# Patient Record
Sex: FEMALE | Race: AMERICAN INDIAN OR ALASKA NATIVE
[De-identification: names, ages, dates, MRNs, and addresses within clinical notes are randomized per-mention and may not be internally consistent; named-entity substitution may affect disease eponyms.]

---

## 2022-05-24 ENCOUNTER — HOSPITAL ENCOUNTER (OUTPATIENT)
Dept: HOSPITAL 5 - TRG | Age: 27
Discharge: HOME | End: 2022-05-24
Attending: OBSTETRICS & GYNECOLOGY
Payer: COMMERCIAL

## 2022-05-24 VITALS — DIASTOLIC BLOOD PRESSURE: 78 MMHG | SYSTOLIC BLOOD PRESSURE: 123 MMHG

## 2022-05-24 DIAGNOSIS — O24.913: ICD-10-CM

## 2022-05-24 DIAGNOSIS — Z3A.33: ICD-10-CM

## 2022-05-24 DIAGNOSIS — O13.3: Primary | ICD-10-CM

## 2022-05-24 DIAGNOSIS — J45.909: ICD-10-CM

## 2022-05-24 DIAGNOSIS — O99.513: ICD-10-CM

## 2022-05-24 LAB
ALT SERPL-CCNC: 8 UNITS/L (ref 7–56)
BACTERIA #/AREA URNS HPF: (no result) /HPF
BILIRUB UR QL STRIP: (no result)
BLOOD UR QL VISUAL: (no result)
HCT VFR BLD CALC: 35.5 % (ref 30.3–42.9)
HGB BLD-MCNC: 12.3 GM/DL (ref 10.1–14.3)
MCHC RBC AUTO-ENTMCNC: 35 % (ref 30–34)
MCV RBC AUTO: 95 FL (ref 79–97)
MUCOUS THREADS #/AREA URNS HPF: (no result) /HPF
PH UR STRIP: 7 [PH] (ref 5–7)
PLATELET # BLD: 313 K/MM3 (ref 140–440)
PROT UR STRIP-MCNC: (no result) MG/DL
RBC # BLD AUTO: 3.76 M/MM3 (ref 3.65–5.03)
RBC #/AREA URNS HPF: 8 /HPF (ref 0–6)
URATE SERPL-MCNC: 3.3 MG/DL (ref 3.5–7.6)
UROBILINOGEN UR-MCNC: < 2 MG/DL (ref ?–2)
WBC #/AREA URNS HPF: 11 /HPF (ref 0–6)

## 2022-05-24 PROCEDURE — 85027 COMPLETE CBC AUTOMATED: CPT

## 2022-05-24 PROCEDURE — 96361 HYDRATE IV INFUSION ADD-ON: CPT

## 2022-05-24 PROCEDURE — 36415 COLL VENOUS BLD VENIPUNCTURE: CPT

## 2022-05-24 PROCEDURE — 83615 LACTATE (LD) (LDH) ENZYME: CPT

## 2022-05-24 PROCEDURE — 81001 URINALYSIS AUTO W/SCOPE: CPT

## 2022-05-24 PROCEDURE — 84550 ASSAY OF BLOOD/URIC ACID: CPT

## 2022-05-24 PROCEDURE — 82565 ASSAY OF CREATININE: CPT

## 2022-05-24 PROCEDURE — 96374 THER/PROPH/DIAG INJ IV PUSH: CPT

## 2022-05-24 PROCEDURE — 84450 TRANSFERASE (AST) (SGOT): CPT

## 2022-05-24 PROCEDURE — 87086 URINE CULTURE/COLONY COUNT: CPT

## 2022-05-24 PROCEDURE — 84460 ALANINE AMINO (ALT) (SGPT): CPT

## 2022-06-30 ENCOUNTER — HOSPITAL ENCOUNTER (EMERGENCY)
Dept: HOSPITAL 5 - ED | Age: 27
LOS: 1 days | Discharge: HOME | End: 2022-07-01
Payer: COMMERCIAL

## 2022-06-30 DIAGNOSIS — Z91.09: ICD-10-CM

## 2022-06-30 DIAGNOSIS — I10: ICD-10-CM

## 2022-06-30 DIAGNOSIS — J45.909: ICD-10-CM

## 2022-06-30 DIAGNOSIS — Z79.899: ICD-10-CM

## 2022-06-30 DIAGNOSIS — N39.0: Primary | ICD-10-CM

## 2022-06-30 DIAGNOSIS — E11.9: ICD-10-CM

## 2022-06-30 PROCEDURE — 82550 ASSAY OF CK (CPK): CPT

## 2022-06-30 PROCEDURE — 96374 THER/PROPH/DIAG INJ IV PUSH: CPT

## 2022-06-30 PROCEDURE — 85025 COMPLETE CBC W/AUTO DIFF WBC: CPT

## 2022-06-30 PROCEDURE — 87086 URINE CULTURE/COLONY COUNT: CPT

## 2022-06-30 PROCEDURE — 93005 ELECTROCARDIOGRAM TRACING: CPT

## 2022-06-30 PROCEDURE — 80053 COMPREHEN METABOLIC PANEL: CPT

## 2022-06-30 PROCEDURE — 81001 URINALYSIS AUTO W/SCOPE: CPT

## 2022-06-30 PROCEDURE — 99284 EMERGENCY DEPT VISIT MOD MDM: CPT

## 2022-06-30 PROCEDURE — 71046 X-RAY EXAM CHEST 2 VIEWS: CPT

## 2022-06-30 PROCEDURE — 36415 COLL VENOUS BLD VENIPUNCTURE: CPT

## 2022-07-01 VITALS — DIASTOLIC BLOOD PRESSURE: 72 MMHG | SYSTOLIC BLOOD PRESSURE: 131 MMHG

## 2022-07-01 LAB
ALBUMIN SERPL-MCNC: 3.6 G/DL (ref 3.9–5)
ALT SERPL-CCNC: 16 UNITS/L (ref 7–56)
BASOPHILS # (AUTO): 0.1 K/MM3 (ref 0–0.1)
BASOPHILS NFR BLD AUTO: 1.1 % (ref 0–1.8)
BILIRUB UR QL STRIP: (no result)
BLOOD UR QL VISUAL: (no result)
BUN SERPL-MCNC: 7 MG/DL (ref 7–17)
BUN/CREAT SERPL: 12 %
CALCIUM SERPL-MCNC: 9.7 MG/DL (ref 8.4–10.2)
EOSINOPHIL # BLD AUTO: 0 K/MM3 (ref 0–0.4)
EOSINOPHIL NFR BLD AUTO: 0.1 % (ref 0–4.3)
HCT VFR BLD CALC: 36.2 % (ref 30.3–42.9)
HEMOLYSIS INDEX: 179
HGB BLD-MCNC: 12.1 GM/DL (ref 10.1–14.3)
LYMPHOCYTES # BLD AUTO: 2 K/MM3 (ref 1.2–5.4)
LYMPHOCYTES NFR BLD AUTO: 25.3 % (ref 13.4–35)
MCHC RBC AUTO-ENTMCNC: 34 % (ref 30–34)
MCV RBC AUTO: 95 FL (ref 79–97)
MONOCYTES # (AUTO): 0.5 K/MM3 (ref 0–0.8)
MONOCYTES % (AUTO): 6.6 % (ref 0–7.3)
MUCOUS THREADS #/AREA URNS HPF: (no result) /HPF
PH UR STRIP: 7 [PH] (ref 5–7)
PLATELET # BLD: 401 K/MM3 (ref 140–440)
PROT UR STRIP-MCNC: (no result) MG/DL
RBC # BLD AUTO: 3.8 M/MM3 (ref 3.65–5.03)
RBC #/AREA URNS HPF: 59 /HPF (ref 0–6)
UROBILINOGEN UR-MCNC: < 2 MG/DL (ref ?–2)
WBC #/AREA URNS HPF: 15 /HPF (ref 0–6)

## 2022-07-01 NOTE — XRAY REPORT
CHEST 2 VIEWS 



INDICATION / CLINICAL INFORMATION: HIGH B P .



COMPARISON: None available.



FINDINGS:



SUPPORT DEVICES: None.

HEART / MEDIASTINUM: No significant abnormality. 

LUNGS / PLEURA: No significant pulmonary or pleural abnormality. No pneumothorax. 



ADDITIONAL FINDINGS: No significant additional findings.



IMPRESSION:

1. No acute findings.



Signer Name: Renzo Valdivia DO 

Signed: 7/1/2022 3:42 AM

Workstation Name: [id number]

## 2022-07-01 NOTE — EMERGENCY DEPARTMENT REPORT
ED General Adult HPI





- General


Chief complaint: High BP


Stated complaint: POST HYPERTENSION


Time Seen by Provider: 22 03:19


Source: patient


Mode of arrival: Ambulatory


Limitations: No Limitations





- History of Present Illness


Initial comments: 


Patient is a 26-year-old -American female history of hypertension.  

Patient is  A0.  Patient is postpartum as of 2022.  Patient presents 

for headache and elevated BP.  There is no chest pain no nausea no vomiting no 

shortness of breath.  No cramping.  No back pain.  Patient states lochia is 

normal using 1-2 pads daily.  Patient denies history of anemia.  Patient is a 

non-smoker no substance.  Patient is breast-feeding making ample milk supply.  

Denies other stressors.  Is been no fevers no chills.  OB/GYN is Dr. Elvia Tompkins.





Severity scale (0 -10): 7





- Related Data


                                  Previous Rx's











 Medication  Instructions  Recorded  Last Taken  Type


 


HYDROcodone/APAP 5-325 [[place] 1 each PO Q6HR PRN #15 tablet 22 Unknown Rx





5/325]    


 


Ibuprofen [Motrin] 800 mg PO Q8HR PRN #30 tablet 22 Unknown Rx


 


Acetaminophen [Acetaminophen TAB] 650 mg PO Q6HR PRN #30 tablet 22 Unknown

Rx


 


cephALEXin [Keflex] 500 mg PO BID 7 Days #14 cap 22 Unknown Rx


 


labetaloL [Labetalol 200mg TAB] 200 mg PO BID #60 tab 22 Unknown Rx











                                    Allergies











Allergy/AdvReac Type Severity Reaction Status Date / Time


 


sulfamethoxazole Allergy Severe Hives Verified 22 20:16





[From Bactrim]     


 


trimethoprim [From Bactrim] Allergy Severe Hives Verified 22 20:16














ED Review of Systems


ROS: 


Stated complaint: POST HYPERTENSION


Other details as noted in HPI





Constitutional: denies: chills, fever


Eyes: denies: eye pain, eye discharge, vision change


ENT: denies: ear pain, throat pain


Respiratory: denies: cough, shortness of breath, wheezing


Cardiovascular: denies: chest pain, palpitations


Endocrine: no symptoms reported


Gastrointestinal: denies: abdominal pain, nausea, vomiting, diarrhea


Genitourinary: denies: urgency, dysuria, discharge


Musculoskeletal: denies: back pain, joint swelling, arthralgia


Skin: denies: rash, lesions


Neurological: headache.  denies: weakness, paresthesias


Psychiatric: denies: anxiety, depression


Hematological/Lymphatic: denies: easy bleeding, easy bruising





ED Past Medical Hx





- Past Medical History


Hx Hypertension: Yes


Hx Heart Attack/AMI: No


Hx Congestive Heart Failure: No


Hx Diabetes: Yes


Hx Deep Vein Thrombosis: No


Hx Liver Disease: No


Hx Renal Disease: No


Hx Sickle Cell Disease: No


Hx Seizures: No


Hx Asthma: Yes (as a child)


Hx COPD: No


Hx HIV: No





- Surgical History


Hx Pacemaker: No


Hx Internal Defibrillator: No





- Social History


Smoking Status: Never Smoker





- Medications


Home Medications: 


                                Home Medications











 Medication  Instructions  Recorded  Confirmed  Last Taken  Type


 


HYDROcodone/APAP 5-325 [[place] 1 each PO Q6HR PRN #15 tablet 22  Unknown Rx





5/325]     


 


Ibuprofen [Motrin] 800 mg PO Q8HR PRN #30 tablet 22  Unknown Rx


 


Acetaminophen [Acetaminophen TAB] 650 mg PO Q6HR PRN #30 tablet 22  

Unknown Rx


 


cephALEXin [Keflex] 500 mg PO BID 7 Days #14 cap 22  Unknown Rx


 


labetaloL [Labetalol 200mg TAB] 200 mg PO BID #60 tab 22  Unknown Rx














ED Physical Exam





- General


Limitations: No Limitations


General appearance: alert, in no apparent distress





- Head


Head exam: Present: atraumatic, normocephalic





- Eye


Eye exam: Present: normal appearance, PERRL, EOMI.  Absent: conjunctival i

njection, nystagmus


Pupils: Present: normal accommodation





- ENT


ENT exam: Present: mucous membranes moist





- Neck


Neck exam: Present: normal inspection, full ROM.  Absent: tenderness, 

lymphadenopathy





- Respiratory


Respiratory exam: Present: normal lung sounds bilaterally.  Absent: respiratory 

distress, wheezes, stridor, chest wall tenderness





- Cardiovascular


Cardiovascular Exam: Present: regular rate, normal rhythm, normal heart sounds. 

Absent: systolic murmur, diastolic murmur, rubs, gallop





- GI/Abdominal


GI/Abdominal exam: Present: soft, normal bowel sounds.  Absent: distended, 

tenderness, guarding, rebound, rigid, bruit, hernia





- Rectal


Rectal exam: Present: deferred





- 


External exam: Present: other (Deferred)





- Extremities Exam


Extremities exam: Present: normal inspection, full ROM, normal capillary refill.

 Absent: tenderness, pedal edema, joint swelling, calf tenderness





- Back Exam


Back exam: Present: normal inspection, full ROM.  Absent: tenderness, CVA 

tenderness (R), CVA tenderness (L)





- Neurological Exam


Neurological exam: Present: alert, oriented X3, CN II-XII intact, normal gait





- Psychiatric


Psychiatric exam: Present: normal affect, normal mood.  Absent: anxious





- Skin


Skin exam: Present: warm, dry, intact, normal color.  Absent: rash





ED Course


                                   Vital Signs











  22





  22:54 03:50 04:00


 


Temperature 98.5 F  


 


Pulse Rate 91 H 81 


 


Respiratory 18  





Rate   


 


Blood Pressure 205/121 178/97 178/97


 


O2 Sat by Pulse 99 95 96





Oximetry   














ED Medical Decision Making





- Lab Data


Result diagrams: 


                                 22 03:55





                                 22 03:55








Labs











  22





  03:55 03:55 Unknown


 


WBC  7.7  


 


RBC  3.80  


 


Hgb  12.1  


 


Hct  36.2  


 


MCV  95  


 


MCH  32  


 


MCHC  34  


 


RDW  13.5  


 


Plt Count  401  


 


Lymph % (Auto)  25.3  


 


Mono % (Auto)  6.6  


 


Eos % (Auto)  0.1  


 


Baso % (Auto)  1.1  


 


Lymph # (Auto)  2.0  


 


Mono # (Auto)  0.5  


 


Eos # (Auto)  0.0  


 


Baso # (Auto)  0.1  


 


Seg Neutrophils %  66.9  


 


Seg Neutrophils #  5.2  


 


Sodium   138 


 


Potassium   5.0 


 


Chloride   101.9 


 


Carbon Dioxide   25 


 


Anion Gap   16 


 


BUN   7 


 


Creatinine   0.6 


 


Estimated GFR   > 60 


 


BUN/Creatinine Ratio   12 


 


Glucose   93 


 


Calcium   9.7 


 


Total Bilirubin   0.40 


 


AST   30 


 


ALT   16 


 


Alkaline Phosphatase   Not Reportable 


 


Total Creatine Kinase   125 


 


Total Protein   7.0 


 


Albumin   3.6 L 


 


Albumin/Globulin Ratio   1.1 


 


Urine Color    Yellow


 


Urine Turbidity    Clear


 


Urine pH    7.0


 


Ur Specific Gravity    1.010


 


Urine Protein    <15 mg/dl


 


Urine Glucose (UA)    Neg


 


Urine Ketones    Neg


 


Urine Blood    Lg


 


Urine Nitrite    Neg


 


Urine Bilirubin    Neg


 


Urine Urobilinogen    < 2.0


 


Ur Leukocyte Esterase    Lg


 


Urine WBC (Auto)    15.0 H


 


Urine RBC (Auto)    59.0


 


U Epithel Cells (Auto)    2.0


 


Urine Mucus    Few


 


Urine Yeast (Budding)    Few














- EKG Data


EKG shows normal: sinus rhythm, axis, intervals, QRS complexes, ST-T waves


Rate: normal





- EKG Data


Interpretation: normal EKG (sinus arhythm no ST elevated MI interpreted by ED 

attending.)





- Radiology Data


Radiology results: report reviewed, image reviewed


 


CHEST 2 VIEWS   


 


 INDICATION / CLINICAL INFORMATION: HIGH B P .  


 


 COMPARISON: None available.  


 


 FINDINGS:  


 


 SUPPORT DEVICES: None.  


 HEART / MEDIASTINUM: No significant abnormality.   


 LUNGS / PLEURA: No significant pulmonary or pleural abnormality. No 

pneumothorax.   


 


 ADDITIONAL FINDINGS: No significant additional findings.  


 


 IMPRESSION:  


 1. No acute findings.  


 


 Signer Name: Renzo Valdivia DO   


 Signed: 2022 3:42 AM  


 Workstation Name: [id number]   


 


 


Transcribed By: NS  


Dictated By: RENZO VALDIVIA DO  


Electronically Authenticated By: RENZO VALDIVIA DO    


Signed Date/Time: 22                                


 


 


 


DD/DT: 22                                                            

 


TD/TT:


Print








- Medical Decision Making


EKG is sinus arrhythmia heart rate 55 normal intervals otherwise normal EKG, 

chest x-ray normal no infiltrates no opacities.  Lung sounds are clear 

throughout respirations are even and nonlabored.  There is no cramping.  There 

is no tachycardia no fever no chills.  Abdomen is nontender.  Labs noted above. 

CPK is normal, UA noted for mild leukocytes and bacteria will treat for UTI.  

This is not preeclampsia.  In face of intermittent nonadherence to metoprolol, 

blood pressure improved to 160/80 plan labetalol 200 mg p.o. twice daily, call O

B/GYN this a.m.  Patient verbalized agreement understanding with same.  Patient 

is currently alert oriented x3 amatory with steady gait tolerating p.o. intake 

patient advises headache is relieved.  Will talk to Dr. Tompkins this a.m.  Patient

DC'd home in stable condition at this time.





Critical care attestation.: 


If time is entered above; I have spent that time in minutes in the direct care 

of this critically ill patient, excluding procedure time.








ED Disposition


Clinical Impression: 


 Chronic hypertension





UTI (urinary tract infection)


Qualifiers:


 Urinary tract infection type: acute cystitis Hematuria presence: without 

hematuria Qualified Code(s): N30.00 - Acute cystitis without hematuria





Disposition:  HOME / SELF CARE / HOMELESS


Is pt being admited?: No


Does the pt Need Aspirin: No


Condition: Stable


Instructions:  Hypertension (ED), Urinary Tract Infection, Adult, Easy-to-Read, 

Hypertension During Pregnancy


Additional Instructions: 


Take medications as prescribed, follow-up with Dr. Tompkins today.  Return to 

emergency department should symptoms worsen.


Prescriptions: 


Acetaminophen [Acetaminophen TAB] 650 mg PO Q6HR PRN #30 tablet


 PRN Reason: Pain


cephALEXin [Keflex] 500 mg PO BID 7 Days #14 cap


labetaloL [Labetalol 200mg TAB] 200 mg PO BID #60 tab


Referrals: 


ELVIA TOMPKINS MD [Staff Physician] - 3-5 Days


Forms:  Work/School Release Form(ED)


Time of Disposition: 05:40

## 2022-07-01 NOTE — ELECTROCARDIOGRAPH REPORT
[facility]

                                       

Test Date:    2022               Test Time:    04:01:20

Pat Name:     ANT MERCADO        Department:   

Patient ID:   [id number]          Room:          

Gender:       F                        Technician:   JUVE

:          [id number]               Requested By: AILYN TOTH

Order Number: [id number]              Reading MD:   Herberth Benavides

                                 Measurements

Intervals                              Axis          

Rate:         55                       P:            54

NV:           151                      QRS:          67

QRSD:         86                       T:            47

QT:           398                                    

QTc:          381                                    

                           Interpretive Statements

Slow sinus arrhythmia

No previous ECG available for comparison

Electronically Signed On 2022 11:17:31 EDT by Herberth Benavides

## 2022-07-20 ENCOUNTER — HOSPITAL ENCOUNTER (INPATIENT)
Dept: HOSPITAL 5 - 3A | Age: 27
LOS: 2 days | Discharge: HOME | DRG: 776 | End: 2022-07-22
Attending: OBSTETRICS & GYNECOLOGY | Admitting: OBSTETRICS & GYNECOLOGY
Payer: COMMERCIAL

## 2022-07-20 DIAGNOSIS — Z20.822: ICD-10-CM

## 2022-07-20 DIAGNOSIS — Z83.3: ICD-10-CM

## 2022-07-20 DIAGNOSIS — Z82.49: ICD-10-CM

## 2022-07-20 LAB
ALT SERPL-CCNC: 14 UNITS/L (ref 7–56)
HCT VFR BLD CALC: 40.4 % (ref 30.3–42.9)
HGB BLD-MCNC: 13 GM/DL (ref 10.1–14.3)
MCHC RBC AUTO-ENTMCNC: 32 % (ref 30–34)
MCV RBC AUTO: 95 FL (ref 79–97)
PLATELET # BLD: 333 K/MM3 (ref 140–440)
RBC # BLD AUTO: 4.26 M/MM3 (ref 3.65–5.03)
URATE SERPL-MCNC: 5.7 MG/DL (ref 3.5–7.6)

## 2022-07-20 PROCEDURE — 86850 RBC ANTIBODY SCREEN: CPT

## 2022-07-20 PROCEDURE — U0003 INFECTIOUS AGENT DETECTION BY NUCLEIC ACID (DNA OR RNA); SEVERE ACUTE RESPIRATORY SYNDROME CORONAVIRUS 2 (SARS-COV-2) (CORONAVIRUS DISEASE [COVID-19]), AMPLIFIED PROBE TECHNIQUE, MAKING USE OF HIGH THROUGHPUT TECHNOLOGIES AS DESCRIBED BY CMS-2020-01-R: HCPCS

## 2022-07-20 PROCEDURE — 86901 BLOOD TYPING SEROLOGIC RH(D): CPT

## 2022-07-20 PROCEDURE — 84550 ASSAY OF BLOOD/URIC ACID: CPT

## 2022-07-20 PROCEDURE — 85027 COMPLETE CBC AUTOMATED: CPT

## 2022-07-20 PROCEDURE — 82565 ASSAY OF CREATININE: CPT

## 2022-07-20 PROCEDURE — 36415 COLL VENOUS BLD VENIPUNCTURE: CPT

## 2022-07-20 PROCEDURE — 86900 BLOOD TYPING SEROLOGIC ABO: CPT

## 2022-07-20 PROCEDURE — 84460 ALANINE AMINO (ALT) (SGPT): CPT

## 2022-07-20 PROCEDURE — 83615 LACTATE (LD) (LDH) ENZYME: CPT

## 2022-07-20 PROCEDURE — 84450 TRANSFERASE (AST) (SGOT): CPT

## 2022-07-20 PROCEDURE — 83735 ASSAY OF MAGNESIUM: CPT

## 2022-07-20 NOTE — HISTORY AND PHYSICAL REPORT
History of Present Illness


Date of examination: 22


Chief complaint: 





headche, scotomata, elevated blood pressure 


History of present illness: 





Pt is a 26 year old -American female  s/p  on 22 with a 

h/o chronic hypertension well-controlled on labetalol 200 mg presents to the 

office today with headache, scotomata and /100. She denies RUQ pain. She 

has a h/o preeclampsia in her last pregnancy.





Past History


Past Medical History: hypertension, other (Irregular hear beat )


Past Surgical History: no surgical history


Family/Genetic History: diabetes, heart disease, hypertension


Social history: no significant social history





- Obstetrical History


: 2


Para: 2


Hx # Term Pregnancies: 1


Number of  Pregnancies: 1


Spontaneous Abortions: 0


Induced : 0


Number of Living Children: 2





Medications and Allergies


                                    Allergies











Allergy/AdvReac Type Severity Reaction Status Date / Time


 


sulfamethoxazole Allergy Severe Hives Verified 22 20:16





[From Bactrim]     


 


trimethoprim [From Bactrim] Allergy Severe Hives Verified 22 20:16











                                Home Medications











 Medication  Instructions  Recorded  Confirmed  Last Taken  Type


 


HYDROcodone/APAP 5-325 [[place] 1 each PO Q6HR PRN #15 tablet 22  Unknown Rx





5/325]     


 


Ibuprofen [Motrin] 800 mg PO Q8HR PRN #30 tablet 22  Unknown Rx


 


Acetaminophen [Acetaminophen TAB] 650 mg PO Q6HR PRN #30 tablet 22  

Unknown Rx


 


cephALEXin [Keflex] 500 mg PO BID 7 Days #14 cap 22  Unknown Rx


 


labetaloL [Labetalol 200mg TAB] 200 mg PO BID #60 tab 22  Unknown Rx











Active Meds: 


Active Medications





Acetaminophen/Butalbital/Caffeine (Butalb/Acetaminophen/Caffeine Tab)  2 tab PO 

Q4H PRN


   PRN Reason: Headache


Calcium Gluconate (Calcium Gluconate 1000 Mg/10 Ml Inj)  1,000 mg IV ONCE PRN


   PRN Reason: Hypomagnesemia


Hydralazine HCl (Hydralazine 20 Mg/1 Ml Inj)  5 mg IV Q30MIN PRN


   PRN Reason: Hypertension


Lactated Ringer's (Lactated Ringers)  1,000 mls @ 125 mls/hr IV AS DIRECT BETTY


Magnesium Sulfate (Magnesium Sulfate 4gm/100ml)  4 gm in 100 mls @ 300 mls/hr IV

ONCE ONE


   Stop: 22 17:09


Magnesium Sulfate (Magnesium Sulfate 40gm/1000ml)  40 gm in 1,000 mls @ 50 

mls/hr IV AS DIRECT BETTY


Labetalol HCl (Labetalol 20 Mg/4 Ml Inj)  20 mg IV ONCE ONE


   Stop: 22 16:51


Labetalol HCl (Labetalol 200 Mg Tab)  200 mg PO BID BETTY











Review of Systems


All systems: negative (per HPI)





- Physical Exam


Breasts: Positive: deferred


Cardiovascular: Regular rate


Lungs: Positive: Clear to auscultation


Abdomen: Positive: soft (obese )


Extremities: Negative: tenderness, edema





Results


All other labs normal.








Assessment and Plan





A: Chronic HTN with superimposed preeclampsia


   


P: Admit for IV magnesium sulfate for seizure prophylaxis


    IV antihypertensives PRN, Home med of Labetalol 200 mg ID ordered


    PIH panel 


    Closely monitor clinical status

## 2022-07-21 NOTE — PROGRESS NOTE
Assessment and Plan





- Patient Problems


(1) Pre-eclampsia, postpartum


Current Visit: Yes   Status: Acute   


Plan to address problem: 


Obtain Magnesium level


 Continue to monitor B/Ps closely








Subjective





- Subjective


Date of service: 22


Principal diagnosis: Postpartum Pre-E


Interval history: 





Pt is a 26 year old -American female  s/p  on 22 with a 

h/o chronic hypertension well-controlled on labetalol 200 mg presents to the 

office today with headache, scotomata and /100. She denies RUQ pain. She 

has a h/o preeclampsia in her last pregnancy. Currently on Magnesium.


Patient reports: other (pt reports "I feel dizzy")





Objective





- Vital Signs


Latest vital signs: 


                                   Vital Signs











  Temp Pulse Resp BP Pulse Ox Pulse Ox


 


 22 09:19   66   131/79  


 


 22 09:17   66    100 


 


 22 09:12   57 L    99 


 


 22 09:07   74    100 


 


 22 09:04   64   127/73  


 


 22 09:02   71    100 


 


 22 08:57   58 L    99 


 


 22 08:52   58 L    99 


 


 22 08:49   67   129/71  


 


 22 08:47   57 L    99 


 


 22 08:42   64    99 


 


 22 08:37   64    100 


 


 22 08:35   69   132/71  


 


 22 08:32   64    100 


 


 22 08:27   73    100 


 


 22 08:22   93 H    100 


 


 22 08:20   82   161/94  


 


 22 08:17   70    100 


 


 22 08:16   76    94 


 


 22 08:12   71    100 


 


 22 08:07   68    99 


 


 22 08:05   71   149/86  


 


 22 08:02   80    100 


 


 22 07:57   67    99 


 


 22 07:52   64    100 


 


 22 07:49   76   138/72  


 


 22 07:47   71    100 


 


 22 07:42   67    99 


 


 22 07:37   66    100 


 


 22 07:34   59 L   131/62  


 


 22 07:32   58 L    99 


 


 22 07:27   56 L    99 


 


 22 07:22   61    99 


 


 22 07:20   72   136/62  


 


 22 07:17   64    99 


 


 22 07:12   63    99 


 


 22 07:07   65    98 


 


 22 07:05   64   118/67  


 


 22 07:02   64    98 


 


 22 06:57   70    98 


 


 22 06:52   64    98 


 


 22 06:49   65   143/73  


 


 22 06:47   65    98 


 


 22 06:42   61    99 


 


 22 06:37   64    99 


 


 22 06:34   69   132/83  


 


 22 06:32   66    98 


 


 22 06:27   74    99 


 


 22 06:22   62    99 


 


 22 06:20   80   139/83  


 


 22 06:17   80    99 


 


 22 06:12   73    98 


 


 22 06:07   58 L    99 


 


 22 06:04   56 L   117/69  


 


 22 06:02   58 L    99 


 


 22 05:57   60    98 


 


 22 05:52   58 L    98 


 


 22 05:49   56 L   125/71  


 


 22 05:47   56 L    99 


 


 22 05:42   62    98 


 


 22 05:37   59 L    98 


 


 22 05:34   63   117/68  


 


 22 05:32   62    98 


 


 22 05:27   61    98 


 


 22 05:22   60    99 


 


 22 05:19   60   123/65  


 


 22 05:17   57 L    99 


 


 22 05:12   57 L    99 


 


 22 05:07   54 L    99 


 


 22 05:05   64   135/68  


 


 22 05:02   54 L    98 


 


 22 04:57   54 L    98 


 


 22 04:52   61    99 


 


 22 04:49   68   148/88  


 


 22 04:47   61    99 


 


 22 04:42   63    99 


 


 22 04:37   56 L    99 


 


 22 04:34   58 L   140/79  


 


 22 04:32   58 L    99 


 


 22 04:27   63    97 


 


 22 04:25   91 H    92 


 


 22 04:22   75    99 


 


 22 04:19   67   142/77  


 


 22 04:17   68    99 


 


 22 04:12   82    100 


 


 22 04:07   65    99 


 


 22 04:04   71   144/75  


 


 22 04:02   66    99 


 


 22 03:57   67    100 


 


 22 03:52   67    100 


 


 22 03:49   78   146/76  


 


 22 03:47   76    99 


 


 22 03:42   110 H    97 


 


 22 03:37   82    100 


 


 22 03:35   80    94 


 


 22 03:34   77   144/73  


 


 22 03:32   61    99 


 


 22 03:27   61    99 


 


 22 03:22   56 L    99 


 


 22 03:19   73   140/68  


 


 22 03:17   71    99 


 


 22 03:12   60    99 


 


 22 03:07   60    99 


 


 22 03:04   66   129/62  


 


 22 03:02   65    98 


 


 22 02:57   66    97 


 


 22 02:52   66    98 


 


 22 02:49   73   129/62  


 


 22 02:47   64    98 


 


 22 02:42   63    98 


 


 22 02:37   59 L    98 


 


 22 02:34   64   144/66  


 


 22 02:32   57 L    99 


 


 22 02:27   61    98 


 


 22 02:22   57 L    98 


 


 22 02:20   62   127/61  


 


 22 02:17   59 L    98 


 


 22 02:12   61    99 


 


 22 02:07   79    98 


 


 22 02:05   74   147/70  


 


 22 02:02   70    98 


 


 22 01:57   72    99 


 


 22 01:52   73    99 


 


 22 01:49   76   156/87  


 


 22 01:47   79    99 


 


 22 01:42   76    99 


 


 22 01:37   67    100 


 


 22 01:35   77   157/91  


 


 22 01:32   84    100 


 


 22 01:27   75    99 


 


 22 01:22   94 H    100 


 


 22 01:20   88   174/104  


 


 22 01:17   100 H    100 


 


 22 01:15   88    94 


 


 22 01:12   74    100 


 


 22 01:07   80    97 


 


 22 01:06   87    91 


 


 22 01:05   93 H   188/84  


 


 22 01:02   76    100 


 


 22 00:57   74    100 


 


 22 00:55   91 H    91 


 


 22 00:52   77    100 


 


 22 00:50   76   181/99  


 


 22 00:47   71    99 


 


 22 00:42   71    100 


 


 22 00:37   77    100 


 


 22 00:34   77   128/67  


 


 22 00:32   76    100 


 


 22 00:27   72    100 


 


 22 00:22   79    100 


 


 22 00:19   71   140/67  


 


 22 00:17   79    98 


 


 22 00:12   73    99 


 


 22 00:07   75    99 


 


 22 00:04   77   131/72  


 


 22 00:02   75    98 


 


 22 23:57   64    99 


 


 22 23:52   66    98 


 


 22 23:49   68   126/70  


 


 22 23:47   69    99 


 


 22 23:42   64    100 


 


 22 23:37   69    100 


 


 22 23:35   75   130/80  


 


 22 23:32   65    100 


 


 22 23:27   72    100 


 


 22 23:22   76    100 


 


 22 23:19   68   152/86  


 


 22 23:17   77    100 


 


 22 23:12   74    100 


 


 22 23:07   75    98 


 


 22 23:04   86   145/79  


 


 07/20/22 23:02   82    98 


 


 0720/22 22:57   82    99 


 


 0720/22 22:52   81    99 


 


 0720/22 22:49   74   143/77  


 


 0720/22 22:47   79    98 


 


 072022 22:42   77    99 


 


 0720/22 22:37   71    99 


 


 072022 22:34   80   147/79  


 


 0720/22 22:32   76    99 


 


 072022 22:27   80    99 


 


 072022 22:22   75    99 


 


 072022 22:19   78   147/76  


 


 0720/22 22:17   103 H    100 


 


 0720/22 22:12   71    99 


 


 0720/22 22:07   69    99 


 


 072022 22:04   76   134/74  


 


 072022 22:02   85    99 


 


 072022 21:57   82    99 


 


 072022 21:52   85    100 


 


 22 21:49   82   142/76  


 


 2022 21:47   86    98 


 


 072022 21:42   92 H    98 


 


 2022 21:37   85    99 


 


 072022 21:35   85   141/73  


 


 072022 21:32   89    99 


 


 0720/22 21:27   89    99 


 


 0720/22 21:22   101 H    99 


 


 0720/22 21:19   94 H   167/83  


 


 072022 21:17   99 H    99 


 


 0720/22 21:12   97 H    99 


 


 2022 21:07   91 H    98 


 


 0722 21:04   92 H   164/94  


 


 0722 21:02   92 H    99 


 


 2022 20:57   88    98 


 


 0720/22 20:52   74    99 


 


 0720/22 20:49   85   155/87  


 


 0720/22 20:47   70    99 


 


 0720/22 20:42   70    100 


 


 0720/22 20:37   86    100 


 


 0720/22 20:34   80   160/88  


 


 0720/22 20:32   83    100 


 


 0720/22 20:27   79    98 


 


 0720/22 20:22   67    100 


 


 0720/22 20:19   71   150/86  


 


 0720/22 20:17   62    99 


 


 0720/22 20:12   70    100 


 


 0720/22 20:07   65    99 


 


 0720/22 20:04   68   140/83  


 


 22 20:02   60    99 


 


 22 19:57   63    99 


 


 22 19:52   68    99 


 


 22 19:47   68    100 


 


 22 19:45   55 L   156/82  


 


 22 19:42   63    99 


 


 22 19:40   59 L   163/80  


 


 22 19:37   70    100 


 


 22 19:35   65   166/85  


 


 22 19:32   66    100 


 


 22 19:30   71   159/82  


 


 22 19:27   65    100 


 


 22 19:26   65   154/77  


 


 22 19:22   76    100 


 


 22 19:17   73   179/79  100 


 


 22 19:12   63    100  98


 


 22 19:10   56 L   199/95  


 


 22 19:07   58 L    100 


 


 22 19:02   59 L    100 


 


 22 18:57   62    100 


 


 22 18:52   57 L    100 


 


 22 18:49   65    87 


 


 22 18:47   63   166/83  99 


 


 22 18:42   65    100 


 


 22 18:37   60    100 


 


 22 18:32   66    100 


 


 22 18:30  98.4 F   20   


 


 22 18:28   56 L   192/79  


 


 22 18:27   74    97 








                                Intake and Output











 22





 23:59 07:59 15:59


 


Intake Total 50  


 


Output Total 500 300 


 


Balance -450 -300 


 


Intake:   


 


  IV 50  


 


    Right Hand 50  


 


Output:   


 


  Urine 500 300 


 


    Void 500 300 


 


Other:   


 


  Total, Output Amount 300 100 


 


  Weight 97.976 kg 97.976 kg 








                                 Patient Weight











 22





 23:59


 


Weight 97.976 kg














- Exam


Breasts: Present: normal


Cardiovascular: Present: Regular rate


Lungs: Present: Normal air movement


Abdomen: Present: soft


Extremities: Present: edema


Deep Tendon Reflex Grade: Normal +2





- Labs


Labs: 


                              Abnormal lab results











  22 Range/Units





  18:56 


 


RDW  12.8 L  (13.2-15.2)  % Patient waiting for [employer].

## 2022-07-22 VITALS — SYSTOLIC BLOOD PRESSURE: 137 MMHG | DIASTOLIC BLOOD PRESSURE: 68 MMHG

## 2022-07-22 NOTE — PROGRESS NOTE
Assessment and Plan





A: Chronic HTN with superimposed preeclampsia s/p magnesium sulfate for seizure 

prophylaxis 


   


P: Increase labetalol 300 mg BID. Discharge today with follow up in 1 wk for BP 

check 





Subjective





- Subjective


Date of service: 07/22/22


Principal diagnosis: Postpartum Pre-E


Interval history: 





Pt feels well today. She denies headache, blurred vision and scotomata. 


Patient reports: appetite normal, voiding normally, pain well controlled, 

ambulating normally





Objective





- Vital Signs


Latest vital signs: 


                                   Vital Signs











  Temp Pulse Resp BP BP Pulse Ox Pulse Ox


 


 07/22/22 04:37  98.8 F  77  18   127/57  


 


 07/22/22 01:05   74   155/92   97 


 


 07/22/22 01:00  98.7 F  97 H  16   147/80  


 


 07/21/22 21:20   61   151/82   


 


 07/21/22 20:57  98.1 F  74  16   150/77  100  99


 


 07/21/22 20:22   76     96 


 


 07/21/22 20:20  97.7 F  68   149/91   


 


 07/21/22 20:17   69     100 


 


 07/21/22 20:15   58 L   144/83   


 


 07/21/22 20:14   78     88 


 


 07/21/22 20:12   71     99 


 


 07/21/22 20:07   66     100 


 


 07/21/22 20:04   61   153/86   


 


 07/21/22 20:02   63     98 


 


 07/21/22 19:57   63     100 


 


 07/21/22 19:52   62     99 


 


 07/21/22 19:49   59 L   150/84   


 


 07/21/22 19:47   61     99 


 


 07/21/22 19:42   63     99 


 


 07/21/22 19:37   58 L     100 


 


 07/21/22 19:34   67   140/76   


 


 07/21/22 19:32   59 L     99 


 


 07/21/22 19:27   59 L     100 


 


 07/21/22 19:22   63     98 


 


 07/21/22 19:20   61   139/77   


 


 07/21/22 19:17   71     100 


 


 07/21/22 19:12   76     93 


 


 07/21/22 19:08   70     89 


 


 07/21/22 19:07   83     99 


 


 07/21/22 19:04   73   142/82   


 


 07/21/22 19:02   77     100 


 


 07/21/22 18:57   78     100 


 


 07/21/22 18:52   71     100 


 


 07/21/22 18:49   72   138/81   


 


 07/21/22 18:47   78     100 


 


 07/21/22 18:42   71     99 


 


 07/21/22 18:37   75     99 


 


 07/21/22 18:36   81     93 


 


 07/21/22 18:34   69   141/82   


 


 07/21/22 18:32   60     100 


 


 07/21/22 18:27   59 L     99 


 


 07/21/22 18:22   65     99 


 


 07/21/22 18:20   62   146/84   


 


 07/21/22 18:17   74     100 


 


 07/21/22 18:12   82     98 


 


 07/21/22 18:07   102 H     97 


 


 07/21/22 18:05   60   124/57   


 


 07/21/22 18:02   60     99 


 


 07/21/22 18:00    16    99 


 


 07/21/22 17:57   58 L     99 


 


 07/21/22 17:52   63     99 


 


 07/21/22 17:49   63   133/68   


 


 07/21/22 17:47   61     99 


 


 07/21/22 17:42   63     99 


 


 07/21/22 17:37   68     99 


 


 07/21/22 17:35   70   135/71   


 


 07/21/22 17:32   67     100 


 


 07/21/22 17:27   69     99 


 


 07/21/22 17:22   65     99 


 


 07/21/22 17:20   79   149/65   


 


 07/21/22 17:17   66     100 


 


 07/21/22 17:12   70     100 


 


 07/21/22 17:07   67     100 


 


 07/21/22 17:05   69   162/76   


 


 07/21/22 17:02   72     99 


 


 07/21/22 17:00    16    99 


 


 07/21/22 16:57   67     100 


 


 07/21/22 16:52   65     99 


 


 07/21/22 16:50   65   128/59   


 


 07/21/22 16:47   67     100 


 


 07/21/22 16:42   67     98 


 


 07/21/22 16:37   64     99 


 


 07/21/22 16:35   68   116/55   


 


 07/21/22 16:32   64     99 


 


 07/21/22 16:27   65     99 


 


 07/21/22 16:22   73     99 


 


 07/21/22 16:20   68   129/60   


 


 07/21/22 16:17   72     99 


 


 07/21/22 16:12   70     99 


 


 07/21/22 16:07   81     98 


 


 07/21/22 16:04   76   123/64   


 


 07/21/22 16:02   82     99 


 


 07/21/22 16:00    16    99 


 


 07/21/22 15:57   80     99 


 


 07/21/22 15:52   75     99 


 


 07/21/22 15:50   83   123/60   


 


 07/21/22 15:48  97.8 F      


 


 07/21/22 15:47   72     100 


 


 07/21/22 15:42   57 L     99 


 


 07/21/22 15:37   72     98 


 


 07/21/22 15:35   73   119/84   


 


 07/21/22 15:32   79     100 


 


 07/21/22 15:27   82     100 


 


 07/21/22 15:22   78     99 


 


 07/21/22 15:20   67   119/75   


 


 07/21/22 15:17   87     99 


 


 07/21/22 15:12   92 H     99 


 


 07/21/22 15:07   94 H     100 


 


 07/21/22 15:06   93 H     94 


 


 07/21/22 15:04   76   115/70   


 


 07/21/22 15:02   82     98 


 


 07/21/22 15:00    16    98 


 


 07/21/22 14:57   81     99 


 


 07/21/22 14:52   83     100 


 


 07/21/22 14:49   81   123/72   


 


 07/21/22 14:47   80     100 


 


 07/21/22 14:42   79     99 


 


 07/21/22 14:37   79     99 


 


 07/21/22 14:34   78   121/64   


 


 07/21/22 14:32   74     99 


 


 07/21/22 14:27   78     99 


 


 07/21/22 14:22   75     99 


 


 07/21/22 14:20   71   133/80   


 


 07/21/22 14:17   78     100 


 


 07/21/22 14:12   77     99 


 


 07/21/22 14:07   73     99 


 


 07/21/22 14:05   76   149/94   


 


 07/21/22 14:02   80     99 


 


 07/21/22 14:00    16    99 


 


 07/21/22 13:57   71     100 


 


 07/21/22 13:52   78     100 


 


 07/21/22 13:49   66   151/93   


 


 07/21/22 13:47   63     100 


 


 07/21/22 13:42   57 L     100 


 


 07/21/22 13:37   64     99 


 


 07/21/22 13:35   69     94 


 


 07/21/22 13:34   59 L   155/96   


 


 07/21/22 13:32   61     100 


 


 07/21/22 13:27   63     100 


 


 07/21/22 13:22   60     100 


 


 07/21/22 13:19   64   146/92   


 


 07/21/22 13:17   67     100 


 


 07/21/22 13:12   62     100 


 


 07/21/22 13:07   64     100 


 


 07/21/22 13:04   71   148/90   


 


 07/21/22 13:02   69     100 


 


 07/21/22 12:57   62     100 


 


 07/21/22 12:52   61     100 


 


 07/21/22 12:51   60     90 


 


 07/21/22 12:49   60   143/80   


 


 07/21/22 12:47   62     99 


 


 07/21/22 12:42   80     90 


 


 07/21/22 12:37   65     100 


 


 07/21/22 12:34   65   142/84   


 


 07/21/22 12:32   66     99 


 


 07/21/22 12:27   62     99 


 


 07/21/22 12:22   72     99 


 


 07/21/22 12:19   69   151/84   


 


 07/21/22 12:17   70     100 


 


 07/21/22 12:12   72     97 


 


 07/21/22 12:11   75     86 


 


 07/21/22 12:07   62     0 L 


 


 07/21/22 12:04   65   159/81   


 


 07/21/22 12:02   75     100 


 


 07/21/22 11:57   68     99 


 


 07/21/22 11:52   64     100 


 


 07/21/22 11:50   67   147/77   


 


 07/21/22 11:47   64     100 


 


 07/21/22 11:42   68     100 


 


 07/21/22 11:37   62     100 


 


 07/21/22 11:34   65   155/79   


 


 07/21/22 11:32   65     100 


 


 07/21/22 11:27   66     100 


 


 07/21/22 11:23   70     93 


 


 07/21/22 11:22  97.9 F  65   152/72   100 


 


 07/21/22 11:20   75   152/72   


 


 07/21/22 11:17   67     100 


 


 07/21/22 11:12   68     100 


 


 07/21/22 11:07   68     100 


 


 07/21/22 11:04   67   154/88   


 


 07/21/22 11:02   71     100 


 


 07/21/22 10:57   71     100 


 


 07/21/22 10:54   74     94 


 


 07/21/22 10:52   72     100 


 


 07/21/22 10:49   67   159/89   


 


 07/21/22 10:47   95 H     100 


 


 07/21/22 10:42   70     100 


 


 07/21/22 10:38   79     92 


 


 07/21/22 10:37   82     100 


 


 07/21/22 10:34   68   158/86   


 


 07/21/22 10:32   80     100 


 


 07/21/22 10:27   69     100 


 


 07/21/22 10:22   72     100 


 


 07/21/22 10:19   77   154/94   


 


 07/21/22 10:17   71     100 


 


 07/21/22 10:15    16    100 


 


 07/21/22 10:12   73     100 


 


 07/21/22 10:07   72     99 


 


 07/21/22 10:04   71   146/89   


 


 07/21/22 10:02   76     100 


 


 07/21/22 09:57   83     100 


 


 07/21/22 09:52   75     99 


 


 07/21/22 09:49   74   136/78   


 


 07/21/22 09:47   74     99 


 


 07/21/22 09:42   72     98 


 


 07/21/22 09:37   73     99 


 


 07/21/22 09:34   65   138/86   


 


 07/21/22 09:32   77     97 


 


 07/21/22 09:27   68     99 


 


 07/21/22 09:26   77     94 


 


 07/21/22 09:22   72     98 


 


 07/21/22 09:19   66   131/79   


 


 07/21/22 09:17   66  16    100  100


 


 07/21/22 09:12   57 L     99 


 


 07/21/22 09:07   74     100 


 


 07/21/22 09:04   64   127/73   


 


 07/21/22 09:02   71     100 


 


 07/21/22 08:57   58 L     99 


 


 07/21/22 08:52   58 L     99 


 


 07/21/22 08:49   67   129/71   


 


 07/21/22 08:47   57 L     99 


 


 07/21/22 08:42   64     99 


 


 07/21/22 08:37   64     100 


 


 07/21/22 08:35   69   132/71   


 


 07/21/22 08:32   64     100 


 


 07/21/22 08:27   73     100 


 


 07/21/22 08:22   93 H     100 


 


 07/21/22 08:20   82   161/94   


 


 07/21/22 08:17   70     100 


 


 07/21/22 08:16   76     94 


 


 07/21/22 08:12   71     100 








                                Intake and Output











 07/21/22 07/22/22 07/22/22





 22:59 06:59 14:59


 


Intake Total  200 


 


Output Total 200 500 


 


Balance -200 -300 


 


Intake:   


 


  Intake, Free Water  200 


 


Output:   


 


  Urine 200 500 


 


    Void 200 500 


 


Other:   


 


  Total, Output Amount 200 500 


 


  # Voids   


 


    Void  1 














- Exam


Breasts: Present: deferred


Abdomen: Present: soft


Extremities: Present: normal





- Labs


Labs: 


                              Abnormal lab results











  07/21/22 Range/Units





  12:51 


 


Magnesium  4.20 H  (1.7-2.3)  mg/dL

## 2022-07-22 NOTE — DISCHARGE SUMMARY
Providers





- Providers


Date of Admission: 


07/20/22 17:15





Date of discharge: 07/22/22


Attending physician: 


STEVEN GOMEZ





Primary care physician: 


ORLIN GONZALEZ








Hospitalization


Reason for admission: other (postpartum preeclampsia)


Procedure details: 





IV Magnesium  


Other postpartum procedures: none


Discharge diagnosis: other (cHTN with superimposed preeclampsia )


Hospital course: 


Pt received IV Magnesium sulfate for seizure prophylaxis for 24 hours with 

marked improvement in BP. She will be discharged on labetalol 300 mg BID and 

will follow up in 1 wk for a BP check.





Condition at discharge: Stable


Disposition: 01 HOME / SELF CARE / HOMELESS





- Discharge Diagnoses


(1) Chronic hypertension with superimposed preeclampsia


Status: Acute   





(2) Pre-eclampsia, postpartum


Status: Acute   





Plan





- Discharge Medications


Prescriptions: 


labetaloL [Labetalol 200mg TAB] 400 mg PO BID #120 tab





- Provider Discharge Summary


Diet: routine


Instructions: routine


Additional instructions: 


[]  Smoking cessation referral if applicable(refer to patient education folder 

for contact #)


[]  Refer to [facility] Booklet








Call your doctor immediately for:


* Fever > 100.5


* Heavy vaginal bleeding ( >1 pad per hour)


* Severe persistent headache


* Shortness of breath


* Reddened, hot, painful area to leg or breast


* Drainage or odor from incision.





* Keep incision clean and dry at all times and follow doctor's instructions 

regarding bathing/showering











- Follow up plan


Follow up: 


ORLIN GONZALEZ FNP [Primary Care Provider] - 7 Days


STEVEN GOMEZ MD [Staff Physician] - 7 Days